# Patient Record
Sex: FEMALE | Race: WHITE | Employment: UNEMPLOYED
[De-identification: names, ages, dates, MRNs, and addresses within clinical notes are randomized per-mention and may not be internally consistent; named-entity substitution may affect disease eponyms.]

---

## 2017-02-06 DIAGNOSIS — M54.50 BILATERAL LOW BACK PAIN WITHOUT SCIATICA: ICD-10-CM

## 2017-02-06 RX ORDER — TIZANIDINE 2 MG/1
2 TABLET ORAL NIGHTLY PRN
Qty: 30 TABLET | Refills: 1 | Status: SHIPPED | OUTPATIENT
Start: 2017-02-06 | End: 2017-04-05 | Stop reason: SDUPTHER

## 2017-02-27 DIAGNOSIS — I10 ESSENTIAL HYPERTENSION: ICD-10-CM

## 2017-02-27 RX ORDER — LISINOPRIL AND HYDROCHLOROTHIAZIDE 25; 20 MG/1; MG/1
1 TABLET ORAL DAILY
Qty: 90 TABLET | Refills: 1 | Status: SHIPPED | OUTPATIENT
Start: 2017-02-27 | End: 2017-08-28 | Stop reason: SDUPTHER

## 2017-10-02 DIAGNOSIS — M54.50 BILATERAL LOW BACK PAIN WITHOUT SCIATICA, UNSPECIFIED CHRONICITY: ICD-10-CM

## 2017-10-02 RX ORDER — TIZANIDINE 2 MG/1
2 TABLET ORAL NIGHTLY PRN
Qty: 30 TABLET | Refills: 1 | Status: SHIPPED | OUTPATIENT
Start: 2017-10-02 | End: 2018-02-26 | Stop reason: SDUPTHER

## 2017-10-02 NOTE — TELEPHONE ENCOUNTER
From: Vlad Arriaga  To:  Francy Dupree MD  Sent: 10/2/2017 12:55 PM EDT  Subject: Medication Renewal Request    Original authorizing provider: MD [place] would like a refill of the following medications:  tiZANidine (Seth Bliss) 2 MG tablet Francy Dupree MD]    Preferred pharmacy: [address] [phone] - F [phone]    Comment:

## 2018-07-16 PROBLEM — E11.9 TYPE 2 DIABETES MELLITUS WITHOUT COMPLICATION, WITHOUT LONG-TERM CURRENT USE OF INSULIN (HCC): Status: ACTIVE | Noted: 2018-07-16

## 2018-07-16 PROBLEM — M15.9 PRIMARY OSTEOARTHRITIS INVOLVING MULTIPLE JOINTS: Status: ACTIVE | Noted: 2018-07-16

## 2018-07-16 PROBLEM — H91.93 BILATERAL HEARING LOSS: Status: ACTIVE | Noted: 2018-07-16

## 2018-07-16 PROBLEM — M15.0 PRIMARY OSTEOARTHRITIS INVOLVING MULTIPLE JOINTS: Status: ACTIVE | Noted: 2018-07-16

## 2020-11-27 ENCOUNTER — NURSE TRIAGE (OUTPATIENT)
Dept: OTHER | Facility: CLINIC | Age: 70
End: 2020-11-27

## 2020-11-27 NOTE — TELEPHONE ENCOUNTER
Reason for Disposition   [1] MODERATE pain (e.g., interferes with normal activities, limping) AND [2] present > 3 days    Answer Assessment - Initial Assessment Questions  1. LOCATION and RADIATION: \"Where is the pain located? \"   Left knee         2. QUALITY: \"What does the pain feel like? \"  (e.g., sharp, dull, aching, burning)      Feels like someone jabs a knife in the side of her leg. Feels fin then gets the shooting pain. 3. SEVERITY: \"How bad is the pain? \" \"What does it keep you from doing? \"   (Scale 1-10; or mild, moderate, severe)    -  MILD (1-3): doesn't interfere with normal activities     -  MODERATE (4-7): interferes with normal activities (e.g., work or school) or awakens from sleep, limping     -  SEVERE (8-10): excruciating pain, unable to do any normal activities, unable to walk          4. ONSET: \"When did the pain start? \" \"Does it come and go, or is it there all the time?\"      1.5 weeks ago     5. RECURRENT: \"Have you had this pain before? \" If so, ask: \"When, and what happened then? \"      No     6. SETTING: \"Has there been any recent work, exercise or other activity that involved that part of the body?\"           7. AGGRAVATING FACTORS: \"What makes the knee pain worse? \" (e.g., walking, climbing stairs, running)      *No Answer*    8. ASSOCIATED SYMPTOMS: \"Is there any swelling or redness of the knee? \"      No     9. OTHER SYMPTOMS: \"Do you have any other symptoms? \" (e.g., chest pain, difficulty breathing, fever, calf pain)      *No Answer*    10. PREGNANCY: \"Is there any chance you are pregnant? \" \"When was your last menstrual period? \"        *No Answer*    Protocols used: KNEE PAIN-ADULT-    Thank you for allowing me to participate in the care of your patient. The patient was connected to triage in response to information provided to the [facility]. Please do not respond through this encounter as the response is not directed to a shared pool.

## 2025-05-14 ENCOUNTER — HOSPITAL ENCOUNTER (OUTPATIENT)
Age: 75
Setting detail: SPECIMEN
Discharge: HOME | End: 2025-05-14

## 2025-05-14 DIAGNOSIS — E11.9 TYPE 2 DIABETES MELLITUS WITHOUT COMPLICATION, WITHOUT LONG-TERM CURRENT USE OF INSULIN (HCC): ICD-10-CM

## 2025-05-14 LAB
CREAT UR-MCNC: 40.5 MG/DL (ref 28–217)
MICROALBUMIN UR-MCNC: <12 MG/L (ref 0–20)
MICROALBUMIN/CREAT UR-RTO: NORMAL MCG/MG CREAT (ref 0–25)